# Patient Record
Sex: MALE | Race: WHITE | NOT HISPANIC OR LATINO | URBAN - METROPOLITAN AREA
[De-identification: names, ages, dates, MRNs, and addresses within clinical notes are randomized per-mention and may not be internally consistent; named-entity substitution may affect disease eponyms.]

---
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
## 2024-10-30 ENCOUNTER — OFFICE VISIT (OUTPATIENT)
Dept: URBAN - METROPOLITAN AREA CLINIC 19 | Facility: CLINIC | Age: 19
End: 2024-10-30
Payer: COMMERCIAL

## 2024-10-30 DIAGNOSIS — K58.0 IRRITABLE BOWEL SYNDROME WITH DIARRHEA: ICD-10-CM

## 2024-10-30 PROCEDURE — 99214 OFFICE O/P EST MOD 30 MIN: CPT | Performed by: INTERNAL MEDICINE

## 2024-10-30 RX ORDER — AMITRIPTYLINE HYDROCHLORIDE 10 MG/1
TAKE ONE TABLET BY MOUTH ONE TIME DAILY AT BEDTIME TABLET, FILM COATED ORAL
Qty: 30 | Refills: 1 | COMMUNITY
Start: 2020-03-24

## 2024-10-30 RX ORDER — CHOLESTYRAMINE 4 G/9G
1 SCOOP POWDER, FOR SUSPENSION ORAL ONCE A DAY
Qty: 30 | Refills: 3 | OUTPATIENT
Start: 2024-10-30

## 2024-10-30 NOTE — HPI-TODAY'S VISIT:
Mr. Mooney is a 19 year old male who was last seen in GI clinic on 6/15/2023 for abdominal pain, diarrhea, and rectal urgency.   On 6/19/2023 Dr. Loredo performed an EGD and colonoscopy. The EGD showed esophageal mucosal changes suggestive of eosinophilic esophagitis, erythematous mucosa in the antrum, and erosive gastropathy. The colonoscopy showed normal terminal ileum and colon.   Pathology of the esophagus showed reflux esophagitis without signs of eosinophilic esophagitis, gastric biopsies were negative for H. pylori, duodenal biopsies were negative for Celiac disease, terminal ileal biopsies were unremarkable, and random colon biopsies were negative for microscopic colitis.  He reports currently having 3 BMs/day and reports his bristol stool scale is 5-7.   Prior history is summarized below:  -He reports symptoms are worse with stress. He reports symptoms are worse before going on a flight or before a lac[employer] game. He has tried dicyclomine or amitriptyline without relief. His mother reports no family history of IBD or Celiac disease.

## 2024-11-18 ENCOUNTER — OFFICE VISIT (OUTPATIENT)
Dept: URBAN - METROPOLITAN AREA TELEHEALTH 2 | Facility: TELEHEALTH | Age: 19
End: 2024-11-18
Payer: COMMERCIAL

## 2024-11-18 DIAGNOSIS — K58.0 IBS-D: ICD-10-CM

## 2024-11-18 PROCEDURE — 97802 MEDICAL NUTRITION INDIV IN: CPT | Performed by: DIETITIAN, REGISTERED

## 2024-11-18 RX ORDER — CHOLESTYRAMINE 4 G/9G
1 SCOOP POWDER, FOR SUSPENSION ORAL ONCE A DAY
Qty: 30 | Refills: 3 | Status: ACTIVE | COMMUNITY
Start: 2024-10-30

## 2024-11-18 RX ORDER — AMITRIPTYLINE HYDROCHLORIDE 10 MG/1
TAKE ONE TABLET BY MOUTH ONE TIME DAILY AT BEDTIME TABLET, FILM COATED ORAL
Qty: 30 | Refills: 1 | COMMUNITY
Start: 2020-03-24

## 2024-12-02 ENCOUNTER — OFFICE VISIT (OUTPATIENT)
Dept: URBAN - METROPOLITAN AREA TELEHEALTH 2 | Facility: TELEHEALTH | Age: 19
End: 2024-12-02

## 2024-12-02 RX ORDER — CHOLESTYRAMINE 4 G/9G
1 SCOOP POWDER, FOR SUSPENSION ORAL ONCE A DAY
Qty: 30 | Refills: 3 | Status: ACTIVE | COMMUNITY
Start: 2024-10-30

## 2024-12-02 RX ORDER — AMITRIPTYLINE HYDROCHLORIDE 10 MG/1
TAKE ONE TABLET BY MOUTH ONE TIME DAILY AT BEDTIME TABLET, FILM COATED ORAL
Qty: 30 | Refills: 1 | COMMUNITY
Start: 2020-03-24

## 2025-02-23 NOTE — PHYSICAL EXAM MUSCULOSKELETAL:
normal gait and station, no tenderness or deformities present There are no Wet Read(s) to document. 70